# Patient Record
Sex: FEMALE | Race: OTHER
[De-identification: names, ages, dates, MRNs, and addresses within clinical notes are randomized per-mention and may not be internally consistent; named-entity substitution may affect disease eponyms.]

---

## 2020-08-12 ENCOUNTER — HOSPITAL ENCOUNTER (EMERGENCY)
Dept: HOSPITAL 93 - ER | Age: 40
Discharge: HOME | End: 2020-08-12
Payer: COMMERCIAL

## 2020-08-12 VITALS — HEIGHT: 62 IN | BODY MASS INDEX: 31.47 KG/M2 | WEIGHT: 171 LBS

## 2020-08-12 DIAGNOSIS — M54.5: Primary | ICD-10-CM

## 2020-08-26 ENCOUNTER — HOSPITAL ENCOUNTER (EMERGENCY)
Dept: HOSPITAL 93 - ER | Age: 40
Discharge: HOME | End: 2020-08-26
Payer: COMMERCIAL

## 2020-08-26 VITALS — BODY MASS INDEX: 31.47 KG/M2 | HEIGHT: 62 IN | WEIGHT: 171 LBS

## 2020-08-26 DIAGNOSIS — M54.5: Primary | ICD-10-CM

## 2020-09-04 ENCOUNTER — HOSPITAL ENCOUNTER (EMERGENCY)
Dept: HOSPITAL 93 - ER | Age: 40
End: 2020-09-04
Payer: COMMERCIAL

## 2020-09-04 VITALS — BODY MASS INDEX: 31.47 KG/M2 | HEIGHT: 62 IN | WEIGHT: 171 LBS

## 2020-09-04 DIAGNOSIS — M54.5: Primary | ICD-10-CM

## 2020-09-12 ENCOUNTER — HOSPITAL ENCOUNTER (EMERGENCY)
Dept: HOSPITAL 93 - ER | Age: 40
Discharge: HOME | End: 2020-09-12
Payer: COMMERCIAL

## 2020-09-12 VITALS — WEIGHT: 171 LBS | BODY MASS INDEX: 31.47 KG/M2 | HEIGHT: 62 IN

## 2020-09-12 DIAGNOSIS — M54.5: Primary | ICD-10-CM

## 2020-09-12 DIAGNOSIS — M54.6: ICD-10-CM

## 2020-10-03 ENCOUNTER — HOSPITAL ENCOUNTER (INPATIENT)
Dept: HOSPITAL 93 - ER | Age: 40
LOS: 5 days | Discharge: HOME | DRG: 842 | End: 2020-10-08
Attending: INTERNAL MEDICINE | Admitting: INTERNAL MEDICINE
Payer: COMMERCIAL

## 2020-10-03 VITALS — BODY MASS INDEX: 31.28 KG/M2 | WEIGHT: 170 LBS | HEIGHT: 62 IN

## 2020-10-03 DIAGNOSIS — D63.0: ICD-10-CM

## 2020-10-03 DIAGNOSIS — C90.00: Primary | ICD-10-CM

## 2020-10-03 DIAGNOSIS — Z20.828: ICD-10-CM

## 2020-10-03 DIAGNOSIS — E87.6: ICD-10-CM

## 2020-10-03 DIAGNOSIS — E83.52: ICD-10-CM

## 2020-10-03 DIAGNOSIS — G89.3: ICD-10-CM

## 2020-10-03 PROCEDURE — 8E0ZXY6 ISOLATION: ICD-10-PCS | Performed by: INTERNAL MEDICINE

## 2020-10-03 PROCEDURE — 30233N1 TRANSFUSION OF NONAUTOLOGOUS RED BLOOD CELLS INTO PERIPHERAL VEIN, PERCUTANEOUS APPROACH: ICD-10-PCS | Performed by: INTERNAL MEDICINE

## 2020-10-03 NOTE — NUR
se recibe paciente alerta, ambulando y orientada en tiempo lugar y
persona. paciente informa que la misma se realizo varios laboratorio sdurante
el fabienne de sophie y alegan que le reportaron la hemoglobina en un valor de 5.0.
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
09/30/[address] Paul Monzones hematologo oncologo. paciente informa que desea
que se comuniquen con el mismo para que puedan transfundirla ivet alegada
recomendacion de dailey medico.

## 2020-10-03 NOTE — NUR
SE ORIENTA PTE SOBRE TX MEDICO EL CUAL REFIERE ENTENDER.SE LE EXTRAEN MUESTRAS
BAJO MEDIDAS ASEPTICAS,SE CANALIZA Y SE COLOCA FLUIDOS DE MANTENIMIENTO BAJANDO
SIN DIFICULTAD.PTE SOLICITA EN LITA QUIERE ESTAR,YA QUE ENTIENDE QUE DEBE
ESTAR NOELLE POR DIAGNOSTICO DE MIELOMA AUNQUE NO SE ENCUENTRA EN QUIMIOTERAPIA
TODAVIA,SE ORIENTA A LA MISMA QUE AL MOMENTO ERA POSIBLE PRADEEP NO SE LE ASEGURA
POR CUANTO TIEMPO,AL NO TENER ORDEN  DE AISLAMIENTO.

## 2020-10-03 NOTE — NUR
SE LLAMA A BANCO DE [facility] [employer] Y SE NOTIFICA A MR.KOURTNEY
TUBOS PILOTOS PARA DOS UNIDADES DE PRBC PARA TRANSFUNDIR.

## 2020-10-05 PROCEDURE — 07DR3ZX EXTRACTION OF ILIAC BONE MARROW, PERCUTANEOUS APPROACH, DIAGNOSTIC: ICD-10-PCS | Performed by: INTERNAL MEDICINE

## 2021-11-21 ENCOUNTER — HOSPITAL ENCOUNTER (EMERGENCY)
Dept: HOSPITAL 93 - ER | Age: 41
Discharge: HOME | End: 2021-11-21
Payer: COMMERCIAL

## 2021-11-21 VITALS — BODY MASS INDEX: 32.25 KG/M2 | WEIGHT: 160 LBS | HEIGHT: 59 IN

## 2021-11-21 DIAGNOSIS — M54.50: Primary | ICD-10-CM

## 2021-11-24 ENCOUNTER — HOSPITAL ENCOUNTER (EMERGENCY)
Dept: HOSPITAL 93 - ER | Age: 41
LOS: 1 days | End: 2021-11-25
Payer: COMMERCIAL

## 2021-11-24 VITALS — HEIGHT: 59 IN | WEIGHT: 160 LBS | BODY MASS INDEX: 32.25 KG/M2

## 2021-11-24 DIAGNOSIS — M54.59: Primary | ICD-10-CM

## 2021-11-24 DIAGNOSIS — M48.56XG: ICD-10-CM

## 2021-11-29 ENCOUNTER — HOSPITAL ENCOUNTER (INPATIENT)
Dept: HOSPITAL 93 - ER | Age: 41
LOS: 16 days | Discharge: HOME | DRG: 803 | End: 2021-12-15
Attending: INTERNAL MEDICINE | Admitting: INTERNAL MEDICINE
Payer: COMMERCIAL

## 2021-11-29 VITALS — WEIGHT: 163 LBS | BODY MASS INDEX: 32.86 KG/M2 | HEIGHT: 59 IN

## 2021-11-29 DIAGNOSIS — D75.89: Primary | ICD-10-CM

## 2021-11-29 DIAGNOSIS — D35.2: ICD-10-CM

## 2021-11-29 DIAGNOSIS — F43.23: ICD-10-CM

## 2021-11-29 DIAGNOSIS — R77.1: ICD-10-CM

## 2021-11-29 DIAGNOSIS — C90.00: ICD-10-CM

## 2021-11-29 DIAGNOSIS — H54.7: ICD-10-CM

## 2021-11-29 DIAGNOSIS — G89.3: ICD-10-CM

## 2021-11-29 DIAGNOSIS — E22.1: ICD-10-CM

## 2021-11-29 DIAGNOSIS — Z94.81: ICD-10-CM

## 2021-11-29 DIAGNOSIS — Z20.822: ICD-10-CM

## 2021-11-29 DIAGNOSIS — M53.3: ICD-10-CM

## 2021-11-29 DIAGNOSIS — M54.18: ICD-10-CM

## 2021-11-29 PROCEDURE — 70551 MRI BRAIN STEM W/O DYE: CPT

## 2021-11-29 PROCEDURE — 70552 MRI BRAIN STEM W/DYE: CPT

## 2021-11-29 PROCEDURE — 3E0F7SF INTRODUCTION OF OTHER GAS INTO RESPIRATORY TRACT, VIA NATURAL OR ARTIFICIAL OPENING: ICD-10-PCS | Performed by: INTERNAL MEDICINE

## 2021-11-29 PROCEDURE — 72148 MRI LUMBAR SPINE W/O DYE: CPT

## 2021-11-29 PROCEDURE — 72146 MRI CHEST SPINE W/O DYE: CPT

## 2021-11-29 PROCEDURE — B030ZZZ MAGNETIC RESONANCE IMAGING (MRI) OF BRAIN: ICD-10-PCS | Performed by: RADIOLOGY

## 2021-12-01 PROCEDURE — BR39ZZZ MAGNETIC RESONANCE IMAGING (MRI) OF LUMBAR SPINE: ICD-10-PCS | Performed by: RADIOLOGY

## 2021-12-01 PROCEDURE — BR37ZZZ MAGNETIC RESONANCE IMAGING (MRI) OF THORACIC SPINE: ICD-10-PCS | Performed by: RADIOLOGY

## 2021-12-02 PROCEDURE — B030Y0Z MAGNETIC RESONANCE IMAGING (MRI) OF BRAIN USING OTHER CONTRAST, UNENHANCED AND ENHANCED: ICD-10-PCS | Performed by: RADIOLOGY

## 2021-12-07 PROCEDURE — 0QB13ZX EXCISION OF SACRUM, PERCUTANEOUS APPROACH, DIAGNOSTIC: ICD-10-PCS | Performed by: RADIOLOGY

## 2021-12-13 PROCEDURE — CW3NYZZ POSITRON EMISSION TOMOGRAPHIC (PET) IMAGING OF WHOLE BODY USING OTHER RADIONUCLIDE: ICD-10-PCS | Performed by: NUCLEAR MEDICINE

## 2022-01-02 ENCOUNTER — HOSPITAL ENCOUNTER (INPATIENT)
Dept: HOSPITAL 93 - ER | Age: 42
LOS: 9 days | Discharge: HOME | DRG: 81 | End: 2022-01-11
Attending: INTERNAL MEDICINE | Admitting: INTERNAL MEDICINE
Payer: COMMERCIAL

## 2022-01-02 VITALS — WEIGHT: 140 LBS | BODY MASS INDEX: 28.22 KG/M2 | HEIGHT: 59 IN

## 2022-01-02 DIAGNOSIS — F43.23: ICD-10-CM

## 2022-01-02 DIAGNOSIS — Z20.822: ICD-10-CM

## 2022-01-02 DIAGNOSIS — H53.8: ICD-10-CM

## 2022-01-02 DIAGNOSIS — Z94.81: ICD-10-CM

## 2022-01-02 DIAGNOSIS — E83.39: ICD-10-CM

## 2022-01-02 DIAGNOSIS — D49.6: ICD-10-CM

## 2022-01-02 DIAGNOSIS — R41.82: ICD-10-CM

## 2022-01-02 DIAGNOSIS — G93.6: Primary | ICD-10-CM

## 2022-01-02 DIAGNOSIS — C90.00: ICD-10-CM

## 2022-01-02 PROCEDURE — BW28ZZZ COMPUTERIZED TOMOGRAPHY (CT SCAN) OF HEAD: ICD-10-PCS | Performed by: RADIOLOGY

## 2022-01-02 PROCEDURE — 8E0ZXY6 ISOLATION: ICD-10-PCS | Performed by: INTERNAL MEDICINE

## 2022-01-02 PROCEDURE — 3E0F7SF INTRODUCTION OF OTHER GAS INTO RESPIRATORY TRACT, VIA NATURAL OR ARTIFICIAL OPENING: ICD-10-PCS | Performed by: INTERNAL MEDICINE

## 2022-01-02 PROCEDURE — 70545 MR ANGIOGRAPHY HEAD W/DYE: CPT

## 2022-01-03 PROCEDURE — B030ZZZ MAGNETIC RESONANCE IMAGING (MRI) OF BRAIN: ICD-10-PCS | Performed by: RADIOLOGY
